# Patient Record
Sex: MALE | Race: BLACK OR AFRICAN AMERICAN | NOT HISPANIC OR LATINO | Employment: OTHER | URBAN - METROPOLITAN AREA
[De-identification: names, ages, dates, MRNs, and addresses within clinical notes are randomized per-mention and may not be internally consistent; named-entity substitution may affect disease eponyms.]

---

## 2017-10-04 ENCOUNTER — HOSPITAL ENCOUNTER (OUTPATIENT)
Dept: ORTHOPEDIC SURGERY | Facility: CLINIC | Age: 52
Discharge: HOME | End: 2017-10-04
Attending: PHYSICIAN ASSISTANT | Admitting: PHYSICIAN ASSISTANT

## 2017-10-16 ENCOUNTER — HOSPITAL ENCOUNTER (OUTPATIENT)
Dept: MRI IMAGING | Facility: HOSPITAL | Age: 52
Discharge: HOME | End: 2017-10-16
Attending: PHYSICIAN ASSISTANT | Admitting: PHYSICIAN ASSISTANT

## 2022-12-01 ENCOUNTER — HOSPITAL ENCOUNTER (OUTPATIENT)
Facility: HOSPITAL | Age: 57
Discharge: HOME | End: 2022-12-01
Attending: EMERGENCY MEDICINE | Admitting: EMERGENCY MEDICINE

## 2022-12-01 VITALS
WEIGHT: 150 LBS | HEIGHT: 71 IN | HEART RATE: 66 BPM | SYSTOLIC BLOOD PRESSURE: 141 MMHG | OXYGEN SATURATION: 100 % | TEMPERATURE: 98.2 F | DIASTOLIC BLOOD PRESSURE: 80 MMHG | BODY MASS INDEX: 21 KG/M2 | RESPIRATION RATE: 18 BRPM

## 2022-12-01 DIAGNOSIS — J10.1 INFLUENZA A: Primary | ICD-10-CM

## 2022-12-01 DIAGNOSIS — H92.09 EAR ACHE: ICD-10-CM

## 2022-12-01 LAB
FLUAV SUBTYP SPEC NAA+PROBE: DETECTED
FLUBV RNA ISLT QL NAA+PROBE: NOT DETECTED
SARS-COV-2 RNA RESP QL NAA+PROBE: NOT DETECTED
STREP A PCR: NOT DETECTED

## 2022-12-01 PROCEDURE — 87636 SARSCOV2 & INF A&B AMP PRB: CPT

## 2022-12-01 PROCEDURE — 99203 OFFICE O/P NEW LOW 30 MIN: CPT | Performed by: NURSE PRACTITIONER

## 2022-12-01 PROCEDURE — G0463 HOSPITAL OUTPT CLINIC VISIT: HCPCS | Performed by: NURSE PRACTITIONER

## 2022-12-01 PROCEDURE — 87651 STREP A DNA AMP PROBE: CPT

## 2022-12-01 NOTE — DISCHARGE INSTRUCTIONS
Acute letting us care for you today.  You have tested positive for influenza a.  You may treat your symptoms with over the counter medications that you tolerate.  If you are not allergic to and can tolerate Sudafed or Sudafed PE these will offer some relief in your nasal congestion and pressure behind your ears.  Please make sure that you have no allergic reaction these medicines.

## 2023-12-21 NOTE — FSED PROVIDER NOTE
EMERGENCY DEPARTMENT ENCOUNTER      Room Number: 08/08    History is provided by the patient, no translation services needed    HPI:    Chief complaint: Right ear pain with concerns of Q-tip in right ear, cough, rhinorrhea, body aches, headache    Location: Right ear    Quality/Severity: Moderate     Timing/Duration: Sunday    Modifying Factors: No modifying factors identified    Associated Symptoms: Positive use of Q-tips to clean his    Narrative: Pt is a 57 y.o. male who presents complaining of fever, body aches, cough, rhinorrhea, headache, sensation of Q-tip in his ear.  Symptoms began on Sunday and have continued.  He states he has been taking Mucinex with no relief in his symptoms.  He states he cleaned his ear with a Q-tip and is concerned that he may may have residual cotton in his ear.  He is alert, oriented, no acute distress.      PMD: Annika Aparicio, [facility]    REVIEW OF SYSTEMS  Review of Systems   Constitutional: Positive for chills, fatigue and fever. Negative for activity change, appetite change, diaphoresis and unexpected weight change.   HENT: Positive for congestion, ear pain, postnasal drip, rhinorrhea and sore throat. Negative for facial swelling, sinus pressure, sinus pain and sneezing.    Eyes: Negative for itching and visual disturbance.   Respiratory: Positive for cough. Negative for chest tightness and shortness of breath.    Cardiovascular: Negative for chest pain, palpitations and leg swelling.   Gastrointestinal: Positive for diarrhea. Negative for constipation, nausea and vomiting.   Genitourinary: Negative.    Musculoskeletal: Positive for myalgias. Negative for arthralgias.   Skin: Negative for pallor and rash.   Allergic/Immunologic: Negative.    Neurological: Positive for headaches. Negative for dizziness, weakness and light-headedness.   Hematological: Negative.    Psychiatric/Behavioral: Negative.          PAST MEDICAL HISTORY  Active Ambulatory Problems     Diagnosis Date  Noted   • No Active Ambulatory Problems     Resolved Ambulatory Problems     Diagnosis Date Noted   • No Resolved Ambulatory Problems     No Additional Past Medical History       PAST SURGICAL HISTORY  History reviewed. No pertinent surgical history.    FAMILY HISTORY  History reviewed. No pertinent family history.    SOCIAL HISTORY  Social History     Socioeconomic History   • Marital status:        ALLERGIES  Penicillins    No current facility-administered medications for this encounter.  No current outpatient medications on file.    PHYSICAL EXAM  ED Triage Vitals [12/01/22 1444]   Temp Heart Rate Resp BP SpO2   98.2 °F (36.8 °C) 66 18 141/80 100 %      Temp src Heart Rate Source Patient Position BP Location FiO2 (%)   Oral Monitor Sitting Left arm --       Physical Exam  Vitals and nursing note reviewed.   Constitutional:       General: He is not in acute distress.     Appearance: Normal appearance. He is normal weight. He is not ill-appearing, toxic-appearing or diaphoretic.   HENT:      Head: Normocephalic and atraumatic.      Right Ear: Hearing, ear canal and external ear normal. A middle ear effusion is present. No foreign body.      Left Ear: Hearing, ear canal and external ear normal. A middle ear effusion is present. No foreign body.      Ears:      Comments: There is no evidence of foreign body in his right ear.  There is no evidence of any remaining cotton from a Q-tip.     Nose: Nose normal.      Mouth/Throat:      Mouth: Mucous membranes are moist.      Pharynx: Oropharynx is clear. No oropharyngeal exudate or posterior oropharyngeal erythema.   Eyes:      General: Lids are normal.      Extraocular Movements: Extraocular movements intact.      Conjunctiva/sclera: Conjunctivae normal.   Cardiovascular:      Rate and Rhythm: Normal rate and regular rhythm.      Pulses: Normal pulses.      Heart sounds: Normal heart sounds.   Pulmonary:      Effort: Pulmonary effort is normal. No respiratory  Detail Level: Generalized distress.      Breath sounds: Normal breath sounds. No stridor. No wheezing or rales.   Chest:      Chest wall: No tenderness.   Musculoskeletal:         General: Normal range of motion.      Cervical back: Normal range of motion and neck supple. No rigidity or tenderness.   Lymphadenopathy:      Cervical: No cervical adenopathy.   Skin:     General: Skin is warm and dry.      Capillary Refill: Capillary refill takes less than 2 seconds.   Neurological:      General: No focal deficit present.      Mental Status: He is alert and oriented to person, place, and time.   Psychiatric:         Mood and Affect: Mood normal.         Behavior: Behavior normal.           LAB RESULTS  Lab Results (last 24 hours)     Procedure Component Value Units Date/Time    COVID-19 and FLU A/B PCR - Swab, Nasopharynx [[id number]]  (Abnormal) Collected: 12/01/22 1447    Specimen: Swab from Nasopharynx Updated: 12/01/22 1608     COVID19 Not Detected     Influenza A PCR Detected     Influenza B PCR Not Detected    Narrative:      Fact sheet for providers: https://www.fda.gov/media/372381/download    Fact sheet for patients: https://www.fda.gov/media/580039/download    Test performed by PCR.    Rapid Strep A Screen - Swab, Throat [[id number]]  (Normal) Collected: 12/01/22 1447    Specimen: Swab from Throat Updated: 12/01/22 1630     STREP A PCR Not Detected            I ordered the above labs and reviewed the results    RADIOLOGY  No Radiology Exams Resulted Within Past 24 Hours    I ordered the above radiologic testing and reviewed the results    PROCEDURES  Procedures      PROGRESS AND CONSULTS  ED Course as of 12/01/22 1847   Thu Dec 01, 2022   1830 Positive for influenza A [VT]   1845 Discussed positive flu test with patient.  Symptom onset has been a Sunday.  He wishes to take over-the-counter medications for his symptoms. [VT]      ED Course User Index  [VT] Dina Moncada, JASON           MEDICAL DECISION MAKING    MDM        DIAGNOSIS  Final diagnoses:   Influenza A   Ear ache       Latest Documented Vital Signs:  As of 18:47 EST  BP- 141/80 HR- 66 Temp- 98.2 °F (36.8 °C) (Oral) O2 sat- 100%    DISPOSITION      Discussed pertinent findings with the patient/family.  Patient/Family voiced understanding of need to follow-up for recheck and further testing as needed.  Return to the Emergency Department warnings were given.         Medication List      No changes were made to your prescriptions during this visit.              Follow-up Information     Annika Aparicio [facility]. Call in 3 days.    Specialty: Pharmacy  Why: As needed, If symptoms worsen  Contact information:  [facility] [facility] [place] [address] [phone]                           Dictated utilizing Dragon dictation   Detail Level: Zone Detail Level: Simple